# Patient Record
Sex: MALE | Race: WHITE | NOT HISPANIC OR LATINO | URBAN - METROPOLITAN AREA
[De-identification: names, ages, dates, MRNs, and addresses within clinical notes are randomized per-mention and may not be internally consistent; named-entity substitution may affect disease eponyms.]

---

## 2022-03-15 DIAGNOSIS — Z00.6 EXAMINATION OF PARTICIPANT OR CONTROL IN CLINICAL RESEARCH: Primary | ICD-10-CM

## 2022-03-15 DIAGNOSIS — M81.8 OSTEOPOROSIS OF DISUSE: ICD-10-CM

## 2022-03-16 ENCOUNTER — TELEPHONE (OUTPATIENT)
Dept: UROLOGY | Facility: CLINIC | Age: 27
End: 2022-03-16
Payer: COMMERCIAL

## 2022-03-16 DIAGNOSIS — Z00.6 EXAMINATION OF PARTICIPANT OR CONTROL IN CLINICAL RESEARCH: Primary | ICD-10-CM

## 2022-03-16 NOTE — TELEPHONE ENCOUNTER
[facility] Call Center    Phone Message    May a detailed message be left on voicemail: yes     Reason for Call: Appointment Intake    Referring Provider Name: Alexandra Silveira DO   Diagnosis and/or Symptoms: Examination of participant or control in clinical research, Dr. Siddiqi for research related assisted ejaculation, Dr. Siddiqi aware    This referral came through as priority 1-2 weeks    Action Taken: Message routed to:  [facility] Urology    Travel Screening: Not Applicable

## 2022-04-07 ENCOUNTER — PRE VISIT (OUTPATIENT)
Dept: UROLOGY | Facility: CLINIC | Age: 27
End: 2022-04-07
Payer: COMMERCIAL

## 2022-04-22 ENCOUNTER — VIRTUAL VISIT (OUTPATIENT)
Dept: UROLOGY | Facility: CLINIC | Age: 27
End: 2022-04-22
Payer: COMMERCIAL

## 2022-04-22 DIAGNOSIS — S14.109S INJURY OF CERVICAL SPINAL CORD, SEQUELA (H): Primary | ICD-10-CM

## 2022-04-22 PROCEDURE — 99202 OFFICE O/P NEW SF 15 MIN: CPT | Mod: 95 | Performed by: UROLOGY
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
## 2022-04-29 ENCOUNTER — TELEPHONE (OUTPATIENT)
Dept: UROLOGY | Facility: CLINIC | Age: 27
End: 2022-04-29
Payer: COMMERCIAL

## 2022-04-29 NOTE — TELEPHONE ENCOUNTER
----- Message from Claudia Garcia LPN sent at 4/29/2022 12:35 PM CDT -----  Cassandra Bruce,    I am not sure what you mean? No holds necessary.  ----- Message -----  From: Edith Mullins RN  Sent: 4/29/2022  12:25 PM CDT  To: Claudia Garcia LPN    Can you answer this?  ----- Message -----  From: Teo Edmonds  Sent: 4/29/2022   9:46 AM CDT  To: Kamilla Najera RN    Do I pull the hold for the other patient at that time?  Please advise  ----- Message -----  From: Kamilla Najera RN  Sent: 4/28/2022   5:35 PM CDT  To: [facility]    Please call to schedule this patient for clinic procedure one hour with Dr. Siddiqi 6/3/22 at 1140 am  Study patient    thanks

## 2022-04-29 NOTE — TELEPHONE ENCOUNTER
LVM on cell to schedule with Devang.  Left clinic number to call.  Time of appt is Karin 3, @ 11:40

## 2022-05-02 NOTE — TELEPHONE ENCOUNTER
Call center called writer with pt on the line. Pt calling back in regards to an appt. Call center stated that the appt is taken but a hold is on the schedule. Writer inquiring about time and seeing if this pt can still be seen.

## 2022-05-10 ENCOUNTER — PRE VISIT (OUTPATIENT)
Dept: UROLOGY | Facility: CLINIC | Age: 27
End: 2022-05-10
Payer: COMMERCIAL

## 2022-05-10 NOTE — TELEPHONE ENCOUNTER
Reason for visit: Vib Stim     Relevant information: injury of cervical spinal cord    Records/imaging/labs/orders: in EPIC    Pt called: no    At Rooming: charge vib stim 1 day prior; 5 pack towels

## 2022-07-08 ENCOUNTER — ANCILLARY PROCEDURE (OUTPATIENT)
Dept: BONE DENSITY | Facility: CLINIC | Age: 27
End: 2022-07-08
Attending: PHYSICAL MEDICINE & REHABILITATION
Payer: MEDICAID

## 2022-07-08 ENCOUNTER — APPOINTMENT (OUTPATIENT)
Dept: LAB | Facility: CLINIC | Age: 27
End: 2022-07-08
Payer: MEDICAID

## 2022-07-08 ENCOUNTER — LAB (OUTPATIENT)
Dept: LAB | Facility: CLINIC | Age: 27
End: 2022-07-08
Attending: PHYSICAL MEDICINE & REHABILITATION
Payer: COMMERCIAL

## 2022-07-08 ENCOUNTER — HOSPITAL ENCOUNTER (OUTPATIENT)
Dept: RESEARCH | Facility: CLINIC | Age: 27
Discharge: HOME | End: 2022-07-08
Attending: PHYSICAL MEDICINE & REHABILITATION
Payer: COMMERCIAL

## 2022-07-08 ENCOUNTER — OFFICE VISIT (OUTPATIENT)
Dept: UROLOGY | Facility: CLINIC | Age: 27
End: 2022-07-08
Payer: COMMERCIAL

## 2022-07-08 ENCOUNTER — LAB REQUISITION (OUTPATIENT)
Dept: LAB | Facility: CLINIC | Age: 27
End: 2022-07-08

## 2022-07-08 VITALS
DIASTOLIC BLOOD PRESSURE: 120 MMHG | HEART RATE: 76 BPM | HEIGHT: 74 IN | WEIGHT: 180 LBS | SYSTOLIC BLOOD PRESSURE: 189 MMHG | BODY MASS INDEX: 23.1 KG/M2

## 2022-07-08 DIAGNOSIS — Z00.6 EXAMINATION OF PARTICIPANT OR CONTROL IN CLINICAL RESEARCH: ICD-10-CM

## 2022-07-08 DIAGNOSIS — M81.8 OSTEOPOROSIS OF DISUSE: ICD-10-CM

## 2022-07-08 DIAGNOSIS — S14.109S INJURY OF CERVICAL SPINAL CORD, SEQUELA (H): ICD-10-CM

## 2022-07-08 DIAGNOSIS — S14.109S INJURY OF CERVICAL SPINAL CORD, SEQUELA (H): Primary | ICD-10-CM

## 2022-07-08 LAB
FASTING STATUS PATIENT QL REPORTED: YES
GLUCOSE SERPL-MCNC: 89 MG/DL (ref 70–99)
HBA1C MFR BLD: 4.8 % (ref 0–5.6)
INSULIN SERPL-ACNC: 7.6 UU/ML (ref 2.6–24.9)

## 2022-07-08 PROCEDURE — 82947 ASSAY GLUCOSE BLOOD QUANT: CPT | Performed by: PHYSICAL MEDICINE & REHABILITATION

## 2022-07-08 PROCEDURE — 300N000004 HC RESEARCH SPECIMEN PROCESSING, MODERATE

## 2022-07-08 PROCEDURE — 89322 SEMEN ANAL STRICT CRITERIA: CPT

## 2022-07-08 PROCEDURE — 89331 RETROGRADE EJACULATION ANAL: CPT

## 2022-07-08 PROCEDURE — 55899 UNLISTED PX MALE GENITAL SYS: CPT | Performed by: UROLOGY

## 2022-07-08 PROCEDURE — 83525 ASSAY OF INSULIN: CPT | Performed by: PHYSICAL MEDICINE & REHABILITATION

## 2022-07-08 ASSESSMENT — PAIN SCALES - GENERAL: PAINLEVEL: NO PAIN (0)

## 2022-07-08 NOTE — PROGRESS NOTES
Penile Vibe Stim Procedure Note    Pt placed supine on procedure table.  Spinal cord injury is at the level c4-5, so he is at risk for autonomic dysreflexia.  Prevention for autonomic dysreflexia: none.  Pre procedure /80's  Post procedure /120.  Pt felt no ill effects such as HA or sweating, so nitropaste not used, he declined.    Procedure:  SPT used to drain bladder.   Bladder flushed with 180cc NS and drained.  20cc Andi's media instilled into bladder and cath plugged.    PVS was then begun with Viberect device.   Pt ejaculated within 30-40 seconds of stimulation.    Results with vibe stim:  He very quickly had a good volume antegrade ejaculation, yellow color with slight red tinge.  Collected, 10cc Andi added, labeled as antegrade in blue top vial.    Retro specimen collected in clear top sterile cup, labeled as retro sample.      Pt tolerated the procedure very well.  BP did increase but no ill effects.    Fluid labeled and patient will send to [facility] for analysis.      You SOTO

## 2022-07-08 NOTE — LETTER
7/8/2022       RE: Pedrito Fang  [address] [place] [facility] [address]     Dear Colleague,    Thank you for referring your patient, Pedrito Fang, to the [facility] [place] at [facility]. Please see a copy of my visit note below.    Penile Vibe Stim Procedure Note    Pt placed supine on procedure table.  Spinal cord injury is at the level c4-5, so he is at risk for autonomic dysreflexia.  Prevention for autonomic dysreflexia: none.  Pre procedure /80's  Post procedure /120.  Pt felt no ill effects such as HA or sweating, so nitropaste not used, he declined.    Procedure:  SPT used to drain bladder.   Bladder flushed with 180cc NS and drained.  20cc Andi's media instilled into bladder and cath plugged.    PVS was then begun with Viberect device.   Pt ejaculated within 30-40 seconds of stimulation.    Results with vibe stim:  He very quickly had a good volume antegrade ejaculation, yellow color with slight red tinge.  Collected, 10cc Andi added, labeled as antegrade in blue top vial.    Retro specimen collected in clear top sterile cup, labeled as retro sample.      Pt tolerated the procedure very well.  BP did increase but no ill effects.    Fluid labeled and patient will send to [facility] for analysis.      You SOTO

## 2022-07-08 NOTE — LETTER
July 27, 2022      Pedrito Fang  [address] [facility] [address]        Dear ,    We are writing to inform you of your test results.    Your semen analysis showed a very good number of sperm.   As predicted, a lot of the sperm were dead ( sitting there waiting to be ejaculated for a long time, sperm will die and show lack of motility).  In theory, the sperm vitality/motility would improve with frequent ejaculations to clear out the dead sperm.     The current quantity and quality of sperm are definitely in the ballpark for IUI (intrauterine inseminations), and are more than enough for IVF.  These are the options for assisted reproductive technology.  Vaginal insemination would be possible at home also, if you were to but a vibrator for home use.     There was no evidence of retrograde ejaculation.        Resulted Orders   Retrograde Semen Analysis ([facility])   Result Value Ref Range    Collection Method        Comment:      Catheterization     Collection Site [facility]     Time of Receipt 12:10 PM     Time of Analysis 12:17 PM     Analysis Temp - Centigrade 24.0 centigrade    Abstinence days        Comment:      Unknown    Agglutination No No    pH 7.6 >=7.2 pH    Volume 0.5 (L) >=1.4 mL      Comment:      Initial volume received 25 mL. Sperm parameters calculated from specimen concentrated (0.5 mL)after centrifugation.    Sperm Concentration 0.0 (L) >=16.0 million/ml      Comment:      SEE COMMENTS    Total Sperm Number 0.0 (L) >=39.0 million      Comment:      SEE COMMENTS    Progressive motility 0 (L) >=30 %      Comment:      SEE COMMENTS    Non-progressive motility 0 %    Immotile 0 %    Total Progressive Motile Number 0.00 million      Comment:      SEE COMMENTS    Vitality        Comment:      Quantity not sufficient for vitality assessment     Round Cells 0.0 <=2.0 million/ml    Consent to Release to Partner No     Narrative    Total of 340 immotile sperm seen in a 10 uL aliquot taken from  specimen concentrated (0.5 mL) after centrifugation.    Signal flags identify report values outside the World Health Organization (2021) reference limits for semen characteristics.       If you have any questions or concerns, please call the clinic at the number listed above.       Sincerely,      aDniel Siddiqi MD

## 2022-07-08 NOTE — NURSING NOTE
"No chief complaint on file.      Height 1.88 m (6' 2\"), weight 81.6 kg (180 lb). Body mass index is 23.11 kg/m .    There is no problem list on file for this patient.      Allergies   Allergen Reactions     Sulfa Drugs      Sulfamethoxazole-Trimethoprim Rash     Septra         Current Outpatient Medications   Medication Sig Dispense Refill     HYDROcodone-acetaminophen (NORCO) 5-325 MG per tablet Take 1 tablet by mouth every 4 hours as needed for pain (Patient not taking: No sig reported) 10 tablet 0       Social History     Tobacco Use     Smoking status: Never Smoker     Smokeless tobacco: Never Used       Josr Rasheed EMT  7/8/2022  11:19 AM  "

## 2022-07-11 LAB
ABNORMAL SPERM MORPHOLOGY: ABNORMAL
ABSTINENCE DAYS: ABNORMAL
ABSTINENCE DAYS: ABNORMAL
AGGLUTINATION: NO
AGGLUTINATION: NO
ANALYSIS TEMP - CENTIGRADE: 24 CENTIGRADE
ANALYSIS TEMP - CENTIGRADE: 24 CENTIGRADE
COLLECTION METHOD: ABNORMAL
COLLECTION METHOD: ABNORMAL
COLLECTION SITE: ABNORMAL
COLLECTION SITE: ABNORMAL
CONSENT TO RELEASE TO PARTNER: NO
CONSENT TO RELEASE TO PARTNER: NO
DAL- RECEIVED TIME: ABNORMAL
DAL- RECEIVED TIME: ABNORMAL
IMMOTILE: 0 %
IMMOTILE: 88 %
LIQUEFIED: ABNORMAL
NON-PROGRESSIVE MOTILITY: 0 %
NON-PROGRESSIVE MOTILITY: 2 %
NORMAL SPERM MORPHOLOGY: ABNORMAL
PROGRESSIVE MOTILITY: 0 %
PROGRESSIVE MOTILITY: 10 %
ROUND CELLS: 0 MILLION/ML
ROUND CELLS: 0.4 MILLION/ML
SPECIMEN PH: 7.6 PH
SPECIMEN PH: 7.6 PH
SPECIMEN VOLUME: 0.5 ML
SPECIMEN VOLUME: 13 ML
SPERM CONCENTRATION: 0 MILLION/ML
SPERM CONCENTRATION: 62.3 MILLION/ML
TIME OF ANALYSIS: ABNORMAL
TIME OF ANALYSIS: ABNORMAL
TOTAL PROGRESSIVE MOTILE NUMBER: 0 MILLION
TOTAL PROGRESSIVE MOTILE NUMBER: 81 MILLION
TOTAL SPERM NUMBER: 0 MILLION
TOTAL SPERM NUMBER: 810 MILLION
VISCOUS: ABNORMAL
VITALITY: 16 %
VITALITY: ABNORMAL

## 2022-07-12 ENCOUNTER — PATIENT OUTREACH (OUTPATIENT)
Dept: UROLOGY | Facility: CLINIC | Age: 27
End: 2022-07-12

## 2022-07-12 NOTE — RESULT ENCOUNTER NOTE
Kamilla, please notify pt of these results:    Your semen analysis showed a very good number of sperm.  As predicted, a lot of the sperm were dead ( sitting there waiting to be ejaculated for a long time, sperm will die and show lack of motility).  In theory, the sperm vitality/motility would improve with frequent ejaculations to clear out the dead sperm.    The current quantity and quality of sperm are definitely in the ballpark for IUI (intrauterine inseminations), and are more than enough for IVF.  These are the options for assisted reproductive technology.  Vaginal insemination would be possible at home also, if you were to but a vibrator for home use.    There was no evidence of retrograde ejaculation.      Please let me know if you have any questions.   Thank You    You SOTO

## 2022-07-12 NOTE — TELEPHONE ENCOUNTER
Made attempts to call patient x 2 to discuss results of SA    Kamilla Najera, RN, BSN  Care Coordinator Urology